# Patient Record
(demographics unavailable — no encounter records)

---

## 2025-07-10 NOTE — PHYSICAL EXAM
[General Appearance - Well Developed] : well developed [General Appearance - Well Nourished] : well nourished [Normal Appearance] : normal appearance [] : no respiratory distress [Abdomen Soft] : soft [Abdomen Tenderness] : non-tender [Normal Station and Gait] : the gait and station were normal for the patient's age [No Focal Deficits] : no focal deficits [Oriented To Time, Place, And Person] : oriented to person, place, and time

## 2025-07-10 NOTE — HISTORY OF PRESENT ILLNESS
[FreeTextEntry1] : Name GOVIND JUDGE MRN [id number]  1949 ------------------------------------------------------------------------------------------------------------------------------------------- Date of First Visit: 25 PCP: Dr. Rolan Rangel ------------------------------------------------------------------------------------------------------------------------------------------- CC: kidney stones   History of Present Illness: GOVIND JUDGE is a 75 year old F PMH Crohn's, Non-Hodgkin's lymphoma, HTN, HLD, kidney stone, who underwent a recent left ureteral stent placement 7/3 for a 4mm left mid ureteral stone. She returned to the ED on  complaining of L flank pain and intermittent, suprapubic pain with voiding. No urologic intervention was performed at the time, and she was discharged with antibiotics and pain medication. Today returns to discuss definitive stone management. She denies fevers, chills, nausea, or vomiting. No changes in overall voiding patterns. Endorses some residual stent discomfort, but it remains tolerable. No other acute complaints. History of 6 mm left sided obstructing kidney stone in  and urine culture with ESBL E. Coli. Patient completed a 14-day course of Ertapenem. Patient subsequently underwent lithotripsy with Dr. Perales in 2021.  UCx from 25 negative   CT in PACS from 7/3/25: IMPRESSION: Mild left hydronephrosis and left hydroureter due to a 0.4 cm calculus in the mid left pelvic ureter.  Kidney Stone History: First-time stone former - NO Concurrent asymptomatic stone(s) - NO Previous stone surgeries - YES Previous passed stones -NO Comorbidities - non-contributory Family history of kidney stones - NO Previous metabolic evaluation - None

## 2025-07-10 NOTE — ASSESSMENT
[FreeTextEntry1] : Assessment: 75 year old female with 4 mm obstructing stone that required stent placement who will be undergoing definitive stone treatment.  Ureteroscopy: I explained the technique in detail and how it is performed. Complete stone free rates (no residual fragments of any size) approach 90% for ureteral stones and likely range from 50-60% for renal stones. Based on anatomy and stone burden, we can consider addition of steerable vacuum aspiration (CVAC) to help reduce residual stone burden. Very commonly, a ureteral stent is left in place at the conclusion of the procedure, but only if needed. I explained that if a stent is placed, it would need to be removed either cystoscopically under local anesthesia or it may have a string left externally through the urethra for removal in a few days after the procedure. Risks of ureteroscopy include, but are not limited to, bleeding, infection, injury to the bladder or ureter, ureteral perforation, ureteral stricture, residual fragments leading to subsequent symptoms or secondary procedures, and other risks involved with general anesthesia. There is also the risk that the procedure needs to be staged into more than one session based on the patient's internal anatomy and the size of the stone(s). Finally, dilation of the ureter and/or ureteral stent placement prior to definitive ureteroscopy may be necessary to achieve ureteral access safely in up to 5% of patients, particularly those who have not been previously instrumented.  I have answered all the patient's questions and they have elected to undergo a ureteroscopy.  Plan: -Schedule for left ureteroscopy with laser lithotripsy  -Medical clearance with pre-op clinic -Discussed s/s to monitor for with stent in place -Tylenol or NSAIDs for pain  I performed history/review of imaging, discussed treatment plan with patient, agree with above transcription by LOUISA.

## 2025-07-10 NOTE — HISTORY OF PRESENT ILLNESS
[FreeTextEntry1] : Name GOVIND JUDGE MRN [id number]  1949 ------------------------------------------------------------------------------------------------------------------------------------------- Date of First Visit: 25 PCP: Dr. Rolan Rangel ------------------------------------------------------------------------------------------------------------------------------------------- CC: kidney stones   History of Present Illness: GOVIND JUDGE is a 75 year old F PMH Crohn's, Non-Hodgkin's lymphoma, HTN, HLD, kidney stone, who underwent a recent left ureteral stent placement 7/3 for a 4mm left mid ureteral stone. She returned to the ED on  complaining of L flank pain and intermittent, suprapubic pain with voiding. No urologic intervention was performed at the time, and she was discharged with antibiotics and pain medication. Today returns to discuss definitive stone management. She denies fevers, chills, nausea, or vomiting. No changes in overall voiding patterns. Endorses some residual stent discomfort, but it remains tolerable. No other acute complaints. History of 6 mm left sided obstructing kidney stone in  and urine culture with ESBL E. Coli. Patient completed a 14-day course of Ertapenem. Patient subsequently underwent lithotripsy with Dr. Perales in 2021.  UCx from 25 negative   CT in PACS from 7/3/25: IMPRESSION: Mild left hydronephrosis and left hydroureter due to a 0.4 cm calculus in the mid left pelvic ureter.  Kidney Stone History: First-time stone former - NO Concurrent asymptomatic stone(s) - NO Previous stone surgeries - YES Previous passed stones -NO Comorbidities - non-contributory Family history of kidney stones - NO Previous metabolic evaluation - None

## 2025-07-10 NOTE — HISTORY OF PRESENT ILLNESS
[FreeTextEntry1] : Name GOVIND JUDGE MRN [id number]  1949 ------------------------------------------------------------------------------------------------------------------------------------------- Date of First Visit: 25 PCP: Dr. Rolan Ranegl ------------------------------------------------------------------------------------------------------------------------------------------- CC: kidney stones   History of Present Illness: GOVIND JUDGE is a 75 year old F PMH Crohn's, Non-Hodgkin's lymphoma, HTN, HLD, kidney stone, who underwent a recent left ureteral stent placement 7/3 for a 4mm left mid ureteral stone. She returned to the ED on  complaining of L flank pain and intermittent, suprapubic pain with voiding. No urologic intervention was performed at the time, and she was discharged with antibiotics and pain medication. Today returns to discuss definitive stone management. She denies fevers, chills, nausea, or vomiting. No changes in overall voiding patterns. Endorses some residual stent discomfort, but it remains tolerable. No other acute complaints. History of 6 mm left sided obstructing kidney stone in  and urine culture with ESBL E. Coli. Patient completed a 14-day course of Ertapenem. Patient subsequently underwent lithotripsy with Dr. Perales in 2021.  UCx from 25 negative   CT in PACS from 7/3/25: IMPRESSION: Mild left hydronephrosis and left hydroureter due to a 0.4 cm calculus in the mid left pelvic ureter.  Kidney Stone History: First-time stone former - NO Concurrent asymptomatic stone(s) - NO Previous stone surgeries - YES Previous passed stones -NO Comorbidities - non-contributory Family history of kidney stones - NO Previous metabolic evaluation - None

## 2025-07-17 NOTE — HISTORY OF PRESENT ILLNESS
[No Pertinent Cardiac History] : no history of aortic stenosis, atrial fibrillation, coronary artery disease, recent myocardial infarction, or implantable device/pacemaker [No Pertinent Pulmonary History] : no history of asthma, COPD, sleep apnea, or smoking [(Patient denies any chest pain, claudication, dyspnea on exertion, orthopnea, palpitations or syncope)] : Patient denies any chest pain, claudication, dyspnea on exertion, orthopnea, palpitations or syncope [Moderate (4-6 METs)] : Moderate (4-6 METs) [Self] : no previous adverse anesthesia reaction [Chronic Anticoagulation] : no chronic anticoagulation [Chronic Kidney Disease] : no chronic kidney disease [Diabetes] : no diabetes [FreeTextEntry1] : LEFT URS LL [FreeTextEntry2] : 7/29/25 [FreeTextEntry3] : Dr. Rm [FreeTextEntry4] : Ms. GOVIND JUDGE is a 75-year-old female with history of Crohn's (on Pentasa and budesonide), SDH '21, non-Hodgkin's lymphoma '14, HTN, HLD, kidney stone s/p L ureteral stent 7/3/25 presenting today to the [facility] prior to ureteroscopy with laser lithotripsy. Smoking cigarettes intermittently, glass of wine daily, no drugs  Crohn's on budesonide 3mg + Pentasa 2g daily  PSHx; appendectomy, hemicolectomy, hysterectomy   [FreeTextEntry8] : able walk 10 blocks, limited by foot pain. able to go up two flights of stairs

## 2025-07-17 NOTE — ASSESSMENT
[Patient Optimized for Surgery] : Patient optimized for surgery [No Further Testing Recommended] : no further testing recommended [FreeTextEntry4] : Ms. GOVIND JUDGE is a 75-year-old female with history of Crohn's (on Pentasa and budesonide), SDH '21, non-Hodgkin's lymphoma '14, HTN, HLD, kidney stone s/p L ureteral stent 7/3/25 presenting today to the [facility] prior to ureteroscopy with laser lithotripsy. Labs drawn 7/9/25 reviewed and unremarkable.  ECG obtained today without ischemic changes, able to achieve >4 METs and no anginal symptoms. Fried/VERONICA 0.3 % 30d risk of MACE. She is considered low risk for a low risk procedure.

## 2025-07-18 NOTE — ASSESSMENT
[FreeTextEntry1] : # Pre-OP cardiac assessment before ureteroscopy and lithotripsy  # Leg pain and heaviness.  Will obtain TTE to provide clearance. Will obtain FRANCESCO/PVR to rule out PAD. Will obtain US Venous Doppler to rule out venous insufficiency. The evaluation for venous reflux should not delay the planned procedure and is intended to take place later. RTC with test results.

## 2025-07-18 NOTE — PHYSICAL EXAM
[Normal S1, S2] : normal S1, S2 [No Edema] : no edema [No Cyanosis] : no cyanosis [Normal DP B/L] : normal DP B/L [Normal] : no rash, no skin lesions [de-identified] : 1/6 systolic murmur 2nd ICS [de-identified] : bilateral pre-tibial varicosities and telangiectasias.

## 2025-07-18 NOTE — REASON FOR VISIT
[Other: ____] : [unfilled] [FreeTextEntry1] : Presents for pre-OP cardiac assessment before ureteroscopy with laser lithotripsy  Patient feels well, denies CP, SOB, dizziness, syncope, palps Can walk level ground without trouble but endorses feeling leg heaviness and tiredness with walking uphill. Occasional she has RAÚL worse at evening and less in AM. She has some varicosities in her LE and denies any hx of DVT. Denies any SOB. Endorses being able to climb up 2 flights of stairs. Patient had abdominal pain and went to ED and was diagnosed with kidney stones. Now she is planned for the procedure. PMH: Crohn's disease, SDH '21, non-Hodgkin's lymphoma '14, HTN, HLD, kidney stone s/p L ureteral stent 7/3/25. PSH; appendectomy, hemicolectomy, hysterectomy.

## 2025-07-21 NOTE — ASSESSMENT
[FreeTextEntry1] : # Pre-OP cardiac assessment before left ureteroscopy and lithotripsy RCRI 0 points. CCS 0, NYHA I, exercise capacity > 4METS. TTE showed preserved LVEF and no sig valvular pathology, mild aortic root dilatation. The patient is at low risk for cardiac events with low-risk procedure. No further testing needed.  # Leg pain and heaviness. Will obtain FRANCESCO/PVR to rule out PAD. Will obtain US Venous Doppler to rule out venous insufficiency. The evaluation for venous reflux should not delay the planned procedure and is intended to take place later.  # mildly dilated Aortic root # HTN Currently BP WNL on amlodipine and HCTZ Cont. current meds. Will perform FUP to assess for dynamic in aortic root.

## 2025-07-21 NOTE — REASON FOR VISIT
[FreeTextEntry1] : FUP for Cardiac risk assessment before ureteroscopy with laser lithotripsy with laser lithotripsy Patient feels well, denies CP, SOB, dizziness, syncope, palps Can walk level ground without trouble but endorses feeling leg heaviness and tiredness with walking uphill. Occasional she has RAÚL worse at evening and less in AM. She has some varicosities in her LE and denies any hx of DVT. Denies any SOB. Endorses being able to climb up 2 flights of stairs. Patient had abdominal pain and went to ED and was diagnosed with kidney stones. Now she is planned for the procedure. PMH: Crohn's disease, SDH '21, non-Hodgkin's lymphoma '14, HTN, HLD, kidney stone s/p L ureteral stent 7/3/25. PSH; appendectomy, hemicolectomy, hysterectomy.  @ 7/21/25 No new symptoms other than pain with urination.

## 2025-07-21 NOTE — REVIEW OF SYSTEMS
[Lower Ext Edema] : lower extremity edema [Dysuria] : dysuria [Negative] : Gastrointestinal [SOB] : no shortness of breath [Dyspnea on exertion] : not dyspnea during exertion [Chest Discomfort] : no chest discomfort [Leg Claudication] : no intermittent leg claudication [Palpitations] : no palpitations [Orthopnea] : no orthopnea [PND] : no PND [Syncope] : no syncope [Pelvic Pain] : no pelvic pain [Abn Vaginal Bleeding] : no unexplained vaginal bleeding